# Patient Record
Sex: MALE | Race: WHITE | Employment: FULL TIME | URBAN - NONMETROPOLITAN AREA
[De-identification: names, ages, dates, MRNs, and addresses within clinical notes are randomized per-mention and may not be internally consistent; named-entity substitution may affect disease eponyms.]

---

## 2018-06-02 ENCOUNTER — OFFICE VISIT (OUTPATIENT)
Dept: PRIMARY CARE CLINIC | Age: 43
End: 2018-06-02
Payer: COMMERCIAL

## 2018-06-02 VITALS
TEMPERATURE: 97.3 F | BODY MASS INDEX: 23.74 KG/M2 | HEIGHT: 74 IN | HEART RATE: 63 BPM | DIASTOLIC BLOOD PRESSURE: 74 MMHG | OXYGEN SATURATION: 99 % | SYSTOLIC BLOOD PRESSURE: 124 MMHG | WEIGHT: 185 LBS

## 2018-06-02 DIAGNOSIS — S39.012A STRAIN OF LUMBAR REGION, INITIAL ENCOUNTER: Primary | ICD-10-CM

## 2018-06-02 PROCEDURE — 99214 OFFICE O/P EST MOD 30 MIN: CPT | Performed by: FAMILY MEDICINE

## 2018-06-02 RX ORDER — PREDNISONE 20 MG/1
TABLET ORAL
Qty: 15 TABLET | Refills: 0 | Status: SHIPPED | OUTPATIENT
Start: 2018-06-02 | End: 2019-03-17 | Stop reason: ALTCHOICE

## 2018-06-02 RX ORDER — CYCLOBENZAPRINE HCL 5 MG
5 TABLET ORAL 3 TIMES DAILY PRN
Qty: 30 TABLET | Refills: 0 | Status: SHIPPED | OUTPATIENT
Start: 2018-06-02 | End: 2018-06-12

## 2018-06-02 ASSESSMENT — ENCOUNTER SYMPTOMS
GASTROINTESTINAL NEGATIVE: 1
RESPIRATORY NEGATIVE: 1
BACK PAIN: 1
EYES NEGATIVE: 1

## 2018-08-22 ENCOUNTER — OFFICE VISIT (OUTPATIENT)
Dept: PRIMARY CARE CLINIC | Age: 43
End: 2018-08-22
Payer: COMMERCIAL

## 2018-08-22 VITALS
SYSTOLIC BLOOD PRESSURE: 112 MMHG | WEIGHT: 190 LBS | HEIGHT: 74 IN | HEART RATE: 72 BPM | TEMPERATURE: 97 F | OXYGEN SATURATION: 97 % | BODY MASS INDEX: 24.38 KG/M2 | DIASTOLIC BLOOD PRESSURE: 74 MMHG

## 2018-08-22 DIAGNOSIS — L25.5 RHUS DERMATITIS: Primary | ICD-10-CM

## 2018-08-22 PROCEDURE — 99213 OFFICE O/P EST LOW 20 MIN: CPT | Performed by: PHYSICIAN ASSISTANT

## 2018-08-22 RX ORDER — TRIAMCINOLONE ACETONIDE 1 MG/G
CREAM TOPICAL 3 TIMES DAILY
Qty: 80 G | Refills: 0 | Status: SHIPPED | OUTPATIENT
Start: 2018-08-22 | End: 2019-03-17 | Stop reason: ALTCHOICE

## 2018-08-22 RX ORDER — PREDNISONE 10 MG/1
TABLET ORAL
Qty: 32 TABLET | Refills: 0 | Status: SHIPPED | OUTPATIENT
Start: 2018-08-22 | End: 2019-03-17 | Stop reason: ALTCHOICE

## 2018-08-22 ASSESSMENT — PATIENT HEALTH QUESTIONNAIRE - PHQ9
SUM OF ALL RESPONSES TO PHQ QUESTIONS 1-9: 0
2. FEELING DOWN, DEPRESSED OR HOPELESS: 0
SUM OF ALL RESPONSES TO PHQ QUESTIONS 1-9: 0
SUM OF ALL RESPONSES TO PHQ9 QUESTIONS 1 & 2: 0
1. LITTLE INTEREST OR PLEASURE IN DOING THINGS: 0

## 2018-08-22 ASSESSMENT — ENCOUNTER SYMPTOMS: SHORTNESS OF BREATH: 0

## 2019-03-05 ENCOUNTER — OFFICE VISIT (OUTPATIENT)
Dept: PRIMARY CARE CLINIC | Age: 44
End: 2019-03-05
Payer: COMMERCIAL

## 2019-03-05 VITALS
TEMPERATURE: 97.9 F | WEIGHT: 199.2 LBS | SYSTOLIC BLOOD PRESSURE: 128 MMHG | OXYGEN SATURATION: 97 % | HEART RATE: 90 BPM | DIASTOLIC BLOOD PRESSURE: 72 MMHG | BODY MASS INDEX: 25.57 KG/M2 | RESPIRATION RATE: 18 BRPM | HEIGHT: 74 IN

## 2019-03-05 DIAGNOSIS — R05.9 COUGH: ICD-10-CM

## 2019-03-05 DIAGNOSIS — J10.1 INFLUENZA A: Primary | ICD-10-CM

## 2019-03-05 DIAGNOSIS — R52 GENERALIZED BODY ACHES: ICD-10-CM

## 2019-03-05 LAB
INFLUENZA A ANTIBODY: POSITIVE
INFLUENZA B ANTIBODY: NEGATIVE

## 2019-03-05 PROCEDURE — 99213 OFFICE O/P EST LOW 20 MIN: CPT | Performed by: PHYSICIAN ASSISTANT

## 2019-03-05 PROCEDURE — 87804 INFLUENZA ASSAY W/OPTIC: CPT | Performed by: PHYSICIAN ASSISTANT

## 2019-03-05 RX ORDER — IBUPROFEN 200 MG
400 TABLET ORAL EVERY 6 HOURS PRN
COMMUNITY
End: 2019-03-17

## 2019-03-05 RX ORDER — DEXTROMETHORPHAN POLISTIREX 30 MG/5ML
60 SUSPENSION ORAL 2 TIMES DAILY PRN
COMMUNITY
End: 2019-03-17 | Stop reason: ALTCHOICE

## 2019-03-05 ASSESSMENT — ENCOUNTER SYMPTOMS
SHORTNESS OF BREATH: 1
COUGH: 1
RHINORRHEA: 1

## 2019-03-05 ASSESSMENT — PATIENT HEALTH QUESTIONNAIRE - PHQ9
SUM OF ALL RESPONSES TO PHQ QUESTIONS 1-9: 0
2. FEELING DOWN, DEPRESSED OR HOPELESS: 0
SUM OF ALL RESPONSES TO PHQ QUESTIONS 1-9: 0
1. LITTLE INTEREST OR PLEASURE IN DOING THINGS: 0
SUM OF ALL RESPONSES TO PHQ9 QUESTIONS 1 & 2: 0

## 2019-03-17 ENCOUNTER — OFFICE VISIT (OUTPATIENT)
Dept: PRIMARY CARE CLINIC | Age: 44
End: 2019-03-17
Payer: COMMERCIAL

## 2019-03-17 VITALS
WEIGHT: 200 LBS | OXYGEN SATURATION: 98 % | HEIGHT: 74 IN | HEART RATE: 81 BPM | RESPIRATION RATE: 16 BRPM | SYSTOLIC BLOOD PRESSURE: 124 MMHG | BODY MASS INDEX: 25.67 KG/M2 | DIASTOLIC BLOOD PRESSURE: 70 MMHG | TEMPERATURE: 97.4 F

## 2019-03-17 DIAGNOSIS — M54.50 RECURRENT LOW BACK PAIN: Primary | ICD-10-CM

## 2019-03-17 PROCEDURE — 99213 OFFICE O/P EST LOW 20 MIN: CPT | Performed by: FAMILY MEDICINE

## 2019-03-17 RX ORDER — CYCLOBENZAPRINE HCL 5 MG
5 TABLET ORAL 3 TIMES DAILY PRN
Qty: 30 TABLET | Refills: 0 | Status: SHIPPED | OUTPATIENT
Start: 2019-03-17 | End: 2021-01-19

## 2019-03-17 RX ORDER — PREDNISONE 20 MG/1
TABLET ORAL
Qty: 16 TABLET | Refills: 0 | Status: SHIPPED | OUTPATIENT
Start: 2019-03-17 | End: 2021-01-19

## 2019-03-17 RX ORDER — CYCLOBENZAPRINE HCL 5 MG
5 TABLET ORAL 3 TIMES DAILY PRN
COMMUNITY
End: 2019-03-17 | Stop reason: SDUPTHER

## 2019-03-17 ASSESSMENT — PATIENT HEALTH QUESTIONNAIRE - PHQ9
1. LITTLE INTEREST OR PLEASURE IN DOING THINGS: 0
2. FEELING DOWN, DEPRESSED OR HOPELESS: 0
SUM OF ALL RESPONSES TO PHQ QUESTIONS 1-9: 0
SUM OF ALL RESPONSES TO PHQ QUESTIONS 1-9: 0
SUM OF ALL RESPONSES TO PHQ9 QUESTIONS 1 & 2: 0

## 2019-03-17 ASSESSMENT — ENCOUNTER SYMPTOMS: BACK PAIN: 1

## 2019-03-21 ENCOUNTER — HOSPITAL ENCOUNTER (OUTPATIENT)
Dept: PHYSICAL THERAPY | Age: 44
Setting detail: THERAPIES SERIES
Discharge: HOME | End: 2019-03-21
Payer: COMMERCIAL

## 2019-03-21 PROCEDURE — 97110 THERAPEUTIC EXERCISES: CPT | Performed by: PHYSICAL THERAPIST

## 2019-03-21 PROCEDURE — 97161 PT EVAL LOW COMPLEX 20 MIN: CPT | Performed by: PHYSICAL THERAPIST

## 2019-03-21 ASSESSMENT — PAIN - FUNCTIONAL ASSESSMENT: PAIN_FUNCTIONAL_ASSESSMENT: PREVENTS OR INTERFERES SOME ACTIVE ACTIVITIES AND ADLS

## 2019-03-21 ASSESSMENT — PAIN DESCRIPTION - FREQUENCY: FREQUENCY: INTERMITTENT

## 2019-03-21 ASSESSMENT — PAIN DESCRIPTION - PROGRESSION: CLINICAL_PROGRESSION: GRADUALLY IMPROVING

## 2019-03-21 ASSESSMENT — PAIN DESCRIPTION - ONSET: ONSET: SUDDEN

## 2019-03-21 ASSESSMENT — PAIN DESCRIPTION - LOCATION: LOCATION: BACK

## 2019-03-21 ASSESSMENT — PAIN DESCRIPTION - PAIN TYPE: TYPE: ACUTE PAIN

## 2019-03-21 ASSESSMENT — PAIN DESCRIPTION - ORIENTATION: ORIENTATION: RIGHT

## 2019-03-21 ASSESSMENT — PAIN DESCRIPTION - DESCRIPTORS: DESCRIPTORS: SHARP

## 2019-03-21 ASSESSMENT — PAIN SCALES - GENERAL: PAINLEVEL_OUTOF10: 7

## 2019-03-26 ENCOUNTER — HOSPITAL ENCOUNTER (OUTPATIENT)
Dept: PHYSICAL THERAPY | Age: 44
Setting detail: THERAPIES SERIES
Discharge: HOME | End: 2019-03-26
Payer: COMMERCIAL

## 2019-03-26 PROCEDURE — 97110 THERAPEUTIC EXERCISES: CPT | Performed by: PHYSICAL THERAPY ASSISTANT

## 2019-03-28 ENCOUNTER — HOSPITAL ENCOUNTER (OUTPATIENT)
Dept: PHYSICAL THERAPY | Age: 44
Setting detail: THERAPIES SERIES
Discharge: HOME | End: 2019-03-28
Payer: COMMERCIAL

## 2019-03-28 PROCEDURE — 97110 THERAPEUTIC EXERCISES: CPT | Performed by: PHYSICAL THERAPY ASSISTANT

## 2019-04-02 ENCOUNTER — HOSPITAL ENCOUNTER (OUTPATIENT)
Dept: PHYSICAL THERAPY | Age: 44
Setting detail: THERAPIES SERIES
Discharge: HOME | End: 2019-04-02
Payer: COMMERCIAL

## 2019-04-02 PROCEDURE — 97110 THERAPEUTIC EXERCISES: CPT | Performed by: PHYSICAL THERAPY ASSISTANT

## 2019-04-02 NOTE — FLOWSHEET NOTE
Physical Therapy Daily Treatment Note    Date:  2019    Patient Name:  Mable Mcnulty    :  1975  MRN: [id number]  Restrictions/Precautions:     Medical/Treatment Diagnosis Information:      · Treatment Diagnosis: M54.5 LBP  Insurance/Certification information:  PT Insurance Information: [employer] benefits  Physician Information:  Referring Practitioner: Michelle Colon 149 of care signed (Y/N):  n  Visit# / total visits:   Pain level: 0/10   Functional Assessment Tool Used: Oswestry LBP Scale  Score: 26/50 = 52 % disability    Time In: 3:46  Time Out:4:26    Progress Note: []  Yes  [x]  No  Next due by: Visit #10, or 19      Subjective:  Patient states he is having 0/10 pain on this date. He had slight discomfort yesterday playing pitch and catch with his kids, the pain subsided after doing some of the exercises provided. He applied a hot pack at night. Objective: NERIS per low chart to increase motion, strength and decrease pain for ease with daily activities and return to prior level of function. Patient tolerated therapy session on this date with no increase of pain. Verbal instructions were provided for proper order of exercises. Educated patient on yoga/pilates that he is interested in doing, stating he should hold off until he is done with therapy. Observation:   Test measurements:    +  Exercises: there ex for lumbar ROM, reduction of post derangement  Exercise/Equipment Resistance/Repetitions Other comments   Lay prone 5'    Prone on elbows 5'    Press up 10x3    B PKF 10x3    Modified backbend 10x2    Back Bend 10x2    Prone hip ext. 10x2    Lumbar Roll     Hip ext/abd x10 initiate   Bridging  FT,FA 10x2    LTR 5\"x10    Fire hydrants  x20 ea  ADV    Quadraped alt.   x20  ADV    Camel/cat x20   ADV      [x] Provided verbal/tactile cueing for activities related to strengthening, flexibility, endurance, ROM. (52510)  [] Provided verbal/tactile cueing for activities related to improving balance, (able)  Long term goal 4: Proper nightly sleep (sleeping throughout the night without pain)      Plan:   [x] Continue per plan of care [] Alter current plan (see comments)  [] Plan of care initiated [] Hold pending MD visit [] Discharge    Plan for Next Session: Continue to progress patient per tolerance    Sohail BAR performed session under direct supervision of Keagan [place] PTA. Electronically signed by:   Forrest Ravi

## 2019-04-05 ENCOUNTER — APPOINTMENT (OUTPATIENT)
Dept: PHYSICAL THERAPY | Age: 44
End: 2019-04-05
Payer: COMMERCIAL

## 2019-04-05 ENCOUNTER — HOSPITAL ENCOUNTER (OUTPATIENT)
Dept: PHYSICAL THERAPY | Age: 44
Setting detail: THERAPIES SERIES
Discharge: HOME | End: 2019-04-05
Payer: COMMERCIAL

## 2019-04-05 PROCEDURE — 97110 THERAPEUTIC EXERCISES: CPT

## 2019-04-05 NOTE — FLOWSHEET NOTE
Physical Therapy Daily Treatment Note    Date:  2019    Patient Name:  Sammy Long    :  1975  MRN: [id number]  Restrictions/Precautions:     Medical/Treatment Diagnosis Information:      · Treatment Diagnosis: M54.5 LBP  Insurance/Certification information:  PT Insurance Information: [employer] benefits  Physician Information:  Referring Practitioner: Michelle Colon 149 of care signed (Y/N):  Yes  Visit# / total visits:   Pain level: 0/10   Functional Assessment Tool Used: Oswestry LBP Scale  Score: 26/50 = 52 % disability    Time In: 3:33  Time Out: 4:15    Progress Note: []  Yes  [x]  No  Next due by: Visit #10, or 19      Subjective:  Patient states he performed yard work around the house Wednesday evening, which made him very sore the next day. Notes that he had to sit a lot yesterday for meetings and that caused increased pain in low back and down his right leg, performed HEP to abolish symptoms. Today patient reports no pain, just sore and stiff. Feels he is over 90% of normal.      Objective: NERIS per low chart to increase motion, strength and decrease pain for ease with daily activities and return to prior level of function. Advanced per flow sheet, increasing reps and resistance as capable. Patient tolerated treatment well, indicating increased soreness in core/LE muscles upon completion. Discussed body mechanics during ADLs with patient to minimize discomfort, provide protective measures and increase efficiency; patient noted good understanding. Observation: Decreased lordotic lumbar curvature noted in standing and with therex.    Test measurements:      Exercises: there ex for lumbar ROM, reduction of post derangement  Exercise/Equipment Resistance/Repetitions Other comments   Lay prone     Prone on elbows 5'    Press up 10x3    B PKF 10x3    Multifidi  Stabilization 10x quadruped   Quadruped Alt U/LE 10x    Quadruped Arch/Sag 10x         3-way hip with core stabilization 10xea    Modified backbend     Back May[place] 9-positions, x5ea    TBand Rows/Ext Selestino Kasper init   TBand SPO 10x, black init        Lumbar Roll          Bridging  FT,FA    LTR              Treadmill 3.5mph, 5' init   [x] Provided verbal/tactile cueing for activities related to strengthening, flexibility, endurance, ROM. (00698)  [] Provided verbal/tactile cueing for activities related to improving balance, coordination, kinesthetic sense, posture, motor skill, proprioception. (12655)    Therapeutic Activities:     [] Therapeutic activities, direct (one-on-one) patient contact (use of dynamic activities to improve functional performance). (21118)    Gait:   [] Provided training and instruction to the patient for ambulation re-education. (74095)    Self-Care/ADL's  [] Self-care/home management training and compensatory training, meal preparation, safety procedures, and instructions in use of assistive technology devices/adaptive equipment, direct one-on-one contact. (16418)    Home Exercise Program:   Lumbar extension progression for post derangement  [x] Reviewed/Progressed HEP activities related to strengthening, flexibility, endurance, ROM. (88389)  [] Reviewed/Progressed HEP activities related to improving balance, coordination, kinesthetic sense, posture, motor skill, proprioception.  (36589)    Manual Treatments:    [] Provided manual therapy to mobilize soft tissue/joints for the purpose of modulating pain, promoting relaxation,  increasing ROM, reducing/eliminating soft tissue swelling/inflammation/restriction, improving soft tissue extensibility. (66201)    Service Based Modalities:      Timed Code Treatment Minutes:    There ex /HEP  42'    Total Treatment Minutes:   43'    Treatment/Activity Tolerance:  [x] Patient tolerated treatment well [] Patient limited by fatique  [] Patient limited by pain  [] Patient limited by other medical complications  [] Other:     Prognosis: [x] Good [] Fair  [] Poor    Patient Requires Follow-up: [] Yes  [] No      Goals:  Short term goals  Time Frame for Short term goals: 1 week    Short term goal 1: Start HEP (compliant)    Long term goals  Time Frame for Long term goals : 4 weeks  Long term goal 1: Pain 1-2/10 for return to regular activity (0/10)  Long term goal 2: Lumbar extension thru 1005 of ROM for proper spinal mechanics  Long term goal 3: Tolerate sitting up to 60 min (able)  Long term goal 4: Proper nightly sleep (sleeping throughout the night without pain)      Plan:   [x] Continue per plan of care [] Alter current plan (see comments)  [] Plan of care initiated [] Hold pending MD visit [] Discharge    Plan for Next Session: Monitor response to advanced flow sheet and progress as tolerated.      Electronically signed by:  Femi Perez

## 2019-04-08 ENCOUNTER — HOSPITAL ENCOUNTER (OUTPATIENT)
Dept: PHYSICAL THERAPY | Age: 44
Setting detail: THERAPIES SERIES
Discharge: HOME | End: 2019-04-08
Payer: COMMERCIAL

## 2019-04-08 PROCEDURE — 97110 THERAPEUTIC EXERCISES: CPT

## 2019-04-08 NOTE — FLOWSHEET NOTE
Physical Therapy Daily Treatment Note    Date:  2019    Patient Name:  Sugey Bangura    :  1975  MRN: [id number]  Restrictions/Precautions:     Medical/Treatment Diagnosis Information:      · Treatment Diagnosis: M54.5 LBP  Insurance/Certification information:  PT Insurance Information: [employer] benefits  Physician Information:  Referring Practitioner: Michelle Colon 149 of care signed (Y/N):  Yes  Visit# / total visits: 6/8  Pain level: 0/10   Functional Assessment Tool Used: Oswestry LBP Scale  Score: 26/50 = 52 % disability    Time In: 3:33  Time Out: 4:20    Progress Note: []  Yes  [x]  No  Next due by: Visit #10, or 19      Subjective:  Patient reports to therapy with no complaints of LBP, however notes feeling stiff today. Notes he worked out in his yard over the weekend and maintained good body mechanics and posture with all activities. Objective: NERIS per low chart to increase motion, strength and decrease pain for ease with daily activities and return to prior level of function. Advanced per flow sheet, increasing reps and resistance as capable. Patient tolerated treatment well, indicating increased soreness in core/LE muscles upon completion. Observation: Decreased lordotic lumbar curvature noted in standing and with therex.    Test measurements:      Exercises: there ex for lumbar ROM, reduction of post derangement  Exercise/Equipment Resistance/Repetitions Other comments   Lay prone     Prone on elbows 5'    Press up 10x3    B PKF 10x3    Multifidi  Stabilization 15x quadruped   Quadruped Alt U/LE 10x    Quadruped Arch/Sag 10x         3-way hip with core stabilization 15xea    Modified backbend 20x    Back Bend 10x2    Squat Matrix 9-positions, x10ea ADV   Machine Rows 50lbs, x15 Init   Lat Pulldowns 70lbs, x15 Init   TBand Step outs Black, x10 Init        Lumbar Roll          Bridging  FT,FA With susan x10    LTR 5\"x10             Treadmill Retro, 5', 1.0mph init   [x] Provided verbal/tactile cueing for activities related to strengthening, flexibility, endurance, ROM. (79208)  [] Provided verbal/tactile cueing for activities related to improving balance, coordination, kinesthetic sense, posture, motor skill, proprioception. (40430)    Therapeutic Activities:     [] Therapeutic activities, direct (one-on-one) patient contact (use of dynamic activities to improve functional performance). (15024)    Gait:   [] Provided training and instruction to the patient for ambulation re-education. (38397)    Self-Care/ADL's  [] Self-care/home management training and compensatory training, meal preparation, safety procedures, and instructions in use of assistive technology devices/adaptive equipment, direct one-on-one contact. (11594)    Home Exercise Program:   Lumbar extension progression for post derangement  [x] Reviewed/Progressed HEP activities related to strengthening, flexibility, endurance, ROM. (42226)  [] Reviewed/Progressed HEP activities related to improving balance, coordination, kinesthetic sense, posture, motor skill, proprioception.  (07767)    Manual Treatments:    [] Provided manual therapy to mobilize soft tissue/joints for the purpose of modulating pain, promoting relaxation,  increasing ROM, reducing/eliminating soft tissue swelling/inflammation/restriction, improving soft tissue extensibility. (57839)    Service Based Modalities:      Timed Code Treatment Minutes:    There ex /HEP  47'    Total Treatment Minutes:   52'    Treatment/Activity Tolerance:  [x] Patient tolerated treatment well [] Patient limited by fatique  [] Patient limited by pain  [] Patient limited by other medical complications  [] Other:     Prognosis: [x] Good [] Fair  [] Poor    Patient Requires Follow-up: [] Yes  [] No      Goals:  Short term goals  Time Frame for Short term goals: 1 week    Short term goal 1: Start HEP (compliant)    Long term goals  Time Frame for Long term goals : 4 weeks  Long term goal 1: Pain 1-2/10 for return to regular activity (0/10)  Long term goal 2: Lumbar extension thru 1005 of ROM for proper spinal mechanics  Long term goal 3: Tolerate sitting up to 60 min (able)  Long term goal 4: Proper nightly sleep (sleeping throughout the night without pain)      Plan:   [x] Continue per plan of care [] Alter current plan (see comments)  [] Plan of care initiated [] Hold pending MD visit [] Discharge    Plan for Next Session: Monitor response to advanced flow sheet and progress as tolerated.      Electronically signed by:  Kiersten Ojeda

## 2019-04-11 ENCOUNTER — HOSPITAL ENCOUNTER (OUTPATIENT)
Dept: PHYSICAL THERAPY | Age: 44
Setting detail: THERAPIES SERIES
Discharge: HOME | End: 2019-04-11
Payer: COMMERCIAL

## 2019-04-11 PROCEDURE — 97110 THERAPEUTIC EXERCISES: CPT

## 2019-04-11 NOTE — FLOWSHEET NOTE
Physical Therapy Daily Treatment Note    Date:  2019    Patient Name:  Mable Mcnulty    :  1975  MRN: [id number]  Restrictions/Precautions:     Medical/Treatment Diagnosis Information:      · Treatment Diagnosis: M54.5 LBP  Insurance/Certification information:  PT Insurance Information: [employer] benefits  Physician Information:  Referring Practitioner: Michelle Colon 149 of care signed (Y/N):  Yes  Visit# / total visits: 7/8  Pain level: 0/10   Functional Assessment Tool Used: Oswestry LBP Scale  Score: 26/50 = 52 % disability    Time In: 3:28  Time Out: 4:16    Progress Note: []  Yes  [x]  No  Next due by: Visit #10, or 19      Subjective:   Patient states he was very sore in his upper back after last session. No recent complaints with low back other than stiffness. Feels he is close to ready to be done with therapy. Objective: NERIS per low chart to increase motion, strength and decrease pain for ease with daily activities and return to prior level of function. Advanced per flow sheet, increasing reps and resistance as capable. Patient tolerated treatment well, indicating increased soreness in core/LE muscles upon completion. Observation: Decreased lordotic lumbar curvature noted in standing and with therex.    Test measurements:  Lumbar AROM: Flexion: distal shin, Extension: 30 degrees, Sidebend: to knees bilaterally (19)    Exercises: there ex for lumbar ROM, reduction of post derangement  Exercise/Equipment Resistance/Repetitions Other comments   Lay prone     Prone on elbows     Press up 10x3    B PKF 10x3    Multifidi  Stabilization 15x quadruped   Quadruped Alt U/LE 15x    Quadruped Arch/Sag 15x         3-way hip with core stabilization 10x ea on [employer]    Modified backbend 20x    Back [address] 9-positions, x10ea ADV   Machine Rows 50lbs, x15    Lat Pulldowns 70lbs, x15    TBand Step outs Black, x15    Resisted side step 3 laps yellow         Lumbar Roll          [employer] With susan x10    LTR 5\"x10             Treadmill Retro, 6', 1.0mph    [x] Provided verbal/tactile cueing for activities related to strengthening, flexibility, endurance, ROM. (87267)  [] Provided verbal/tactile cueing for activities related to improving balance, coordination, kinesthetic sense, posture, motor skill, proprioception. (13322)    Therapeutic Activities:     [] Therapeutic activities, direct (one-on-one) patient contact (use of dynamic activities to improve functional performance). (06706)    Gait:   [] Provided training and instruction to the patient for ambulation re-education. (16719)    Self-Care/ADL's  [] Self-care/home management training and compensatory training, meal preparation, safety procedures, and instructions in use of assistive technology devices/adaptive equipment, direct one-on-one contact. (91192)    Home Exercise Program:   Lumbar extension progression for post derangement  [x] Reviewed/Progressed HEP activities related to strengthening, flexibility, endurance, ROM. (01038)  [] Reviewed/Progressed HEP activities related to improving balance, coordination, kinesthetic sense, posture, motor skill, proprioception.  (75640)    Manual Treatments:    [] Provided manual therapy to mobilize soft tissue/joints for the purpose of modulating pain, promoting relaxation,  increasing ROM, reducing/eliminating soft tissue swelling/inflammation/restriction, improving soft tissue extensibility. (37282)    Service Based Modalities:      Timed Code Treatment Minutes:    There ex /HEP  48'    Total Treatment Minutes:   50'    Treatment/Activity Tolerance:  [x] Patient tolerated treatment well [] Patient limited by fatique  [] Patient limited by pain  [] Patient limited by other medical complications  [] Other:     Prognosis: [x] Good [] Fair  [] Poor    Patient Requires Follow-up: [] Yes  [] No      Goals:  Short term goals  Time Frame for Short term goals: 1 week    Short term goal 1: Start HEP (compliant)    Long term goals  Time Frame for Long term goals : 4 weeks  Long term goal 1: Pain 1-2/10 for return to regular activity (0/10)  Long term goal 2: Lumbar extension thru 1005 of ROM for proper spinal mechanics 30degrees Extension  Long term goal 3: Tolerate sitting up to 60 min (able)  Long term goal 4: Proper nightly sleep (sleeping throughout the night without pain)      Plan:   [x] Continue per plan of care [] Alter current plan (see comments)  [] Plan of care initiated [] Hold pending MD visit [] Discharge    Plan for Next Session: Patient may be ready for discharge next visit.      Electronically signed by:  Robin Garibay

## 2019-04-15 ENCOUNTER — HOSPITAL ENCOUNTER (OUTPATIENT)
Dept: PHYSICAL THERAPY | Age: 44
Setting detail: THERAPIES SERIES
Discharge: HOME | End: 2019-04-15
Payer: COMMERCIAL

## 2019-04-15 PROCEDURE — 97110 THERAPEUTIC EXERCISES: CPT | Performed by: PHYSICAL THERAPIST

## 2019-04-15 NOTE — FLOWSHEET NOTE
Physical Therapy Daily Treatment Note    Date:  4/15/2019    Patient Name:  Oliver Gregory    :  1975  MRN: [id number]  Restrictions/Precautions:     Medical/Treatment Diagnosis Information:      · Treatment Diagnosis: M54.5 LBP  Insurance/Certification information:  PT Insurance Information: [employer] benefits  Physician Information:  Referring Practitioner: Michelle Colon 149 of care signed (Y/N):  Yes  Visit# / total visits:   Pain level: 0/10   Functional Assessment Tool Used: Oswestry LBP Scale  Score: 0/50 = 0 % disability    Time In: 3:57 Time Out: 4:25    Progress Note: [x]  Yes  []  No  Next due by: Visit #10, or 19      Subjective:  Doing very good. No pain over the weekend. Objective:   Observation:     Test measurements:   Full range prone extension  Negative dural tension with SLR to 85 deg. Negative Slump     Exercises: there ex for lumbar ROM, reduction of post derangement  Exercise/Equipment Resistance/Repetitions Other comments   Lay prone     Prone on elbows     Press up 10x3 2 sets PT OP   B PKF 10x3    Multifidi  Stabilization 15x quadruped   Quadruped Alt U/LE 15x    Quadruped Arch/Sag 10x         3-way hip with core stabilization     Modified backbend 10x, x2    Back Bend 10x2    Squat Matrix 9-positions, x10ea ADV   Machine Rows     Lat Pulldowns     TBand Step outs     Resisted side step          Lumbar Roll          Bridges     LTR              Treadmill Retro, 6', 1.0mph    [x] Provided verbal/tactile cueing for activities related to strengthening, flexibility, endurance, ROM. (45910)  [] Provided verbal/tactile cueing for activities related to improving balance, coordination, kinesthetic sense, posture, motor skill, proprioception. (94935)    Therapeutic Activities:     [] Therapeutic activities, direct (one-on-one) patient contact (use of dynamic activities to improve functional performance).  (89590)    Gait:   [] Provided training and instruction to the patient for ambulation

## 2019-04-15 NOTE — DISCHARGE SUMMARY
[facility]    [x] [place]  Phone: [phone]  Fax: [phone]      [] [place]  Phone: [phone]  Fax: [phone]    Physical Therapy Discharge Note  Date: 4/15/2019        Patient Name:  aJvier Higginbotham    :  1975  MRN: [id number]  Restrictions/Precautions:      Medical/Treatment Diagnosis Information:  ·   M54.5 LBP  ·    Insurance/Certification information:    [employer] benefits  Physician Information:     Black    Plan of care signed (Y/N): y  Visit# / total visits:  8  Pain level: 0/10         Time Period for Report:   Cancels/No-shows to date:     Plan of Care/Treatment to date:  [x] Therapeutic Exercise    [] Modalities:  [] Therapeutic Activity     [] Ultrasound  [] Electrical Stimulation  [] Gait Training      [] Cervical Traction    [] Lumbar Traction  [] Neuromuscular Re-education  [] Cold/hotpack [] Iontophoresis  [x] Instruction in HEP      Other:  [x] Manual Therapy       []    [] Aquatic Therapy       []                    ? Subjective:     Doing very good. No pain over the weekend             Objective:    Test measurements:   Full range prone extension  Negative dural tension with SLR to 85 deg.  Negative Slump       Plan:    d/c       Goals:    Short term goals  Time Frame for Short term goals: 1 week    Short term goal 1: Start HEP - met     Long term goals  Time Frame for Long term goals : 4 weeks  Long term goal 1: Pain 1-2/10 for return to regular activity - met  Long term goal 2: Lumbar extension thru 100 % of ROM for proper spinal mechanics - met  Long term goal 3: Tolerate sitting up to 60 min - met  Long term goal 4: Proper nightly sleep -met             Percentage of Goals Met: 100            Discharge Prognosis: [] Excellent [x] Good [] Fair  [] Poor     Goal Status:  [x] Achieved [] Partially Achieved  [] Not Achieved       Electronically signed by:  Nick Ricks, PT

## 2020-08-17 ENCOUNTER — HOSPITAL ENCOUNTER (EMERGENCY)
Age: 45
Discharge: HOME | End: 2020-08-17
Attending: EMERGENCY MEDICINE
Payer: COMMERCIAL

## 2020-08-17 ENCOUNTER — APPOINTMENT (OUTPATIENT)
Dept: CT IMAGING | Age: 45
End: 2020-08-17
Payer: COMMERCIAL

## 2020-08-17 VITALS
HEART RATE: 77 BPM | TEMPERATURE: 96.4 F | RESPIRATION RATE: 14 BRPM | OXYGEN SATURATION: 100 % | DIASTOLIC BLOOD PRESSURE: 78 MMHG | SYSTOLIC BLOOD PRESSURE: 122 MMHG

## 2020-08-17 PROCEDURE — 96372 THER/PROPH/DIAG INJ SC/IM: CPT

## 2020-08-17 PROCEDURE — 99284 EMERGENCY DEPT VISIT MOD MDM: CPT

## 2020-08-17 PROCEDURE — 70450 CT HEAD/BRAIN W/O DYE: CPT

## 2020-08-17 PROCEDURE — 6360000002 HC RX W HCPCS: Performed by: EMERGENCY MEDICINE

## 2020-08-17 PROCEDURE — 6370000000 HC RX 637 (ALT 250 FOR IP): Performed by: EMERGENCY MEDICINE

## 2020-08-17 RX ORDER — ONDANSETRON 4 MG/1
4 TABLET, ORALLY DISINTEGRATING ORAL ONCE
Status: COMPLETED | OUTPATIENT
Start: 2020-08-17 | End: 2020-08-17

## 2020-08-17 RX ORDER — KETOROLAC TROMETHAMINE 30 MG/ML
30 INJECTION, SOLUTION INTRAMUSCULAR; INTRAVENOUS ONCE
Status: COMPLETED | OUTPATIENT
Start: 2020-08-17 | End: 2020-08-17

## 2020-08-17 RX ADMIN — KETOROLAC TROMETHAMINE 30 MG: 30 INJECTION, SOLUTION INTRAMUSCULAR at 21:22

## 2020-08-17 RX ADMIN — ONDANSETRON 4 MG: 4 TABLET, ORALLY DISINTEGRATING ORAL at 21:23

## 2020-08-17 ASSESSMENT — PAIN SCALES - GENERAL: PAINLEVEL_OUTOF10: 6

## 2020-08-17 ASSESSMENT — PAIN DESCRIPTION - ONSET: ONSET: ON-GOING

## 2020-08-17 ASSESSMENT — PAIN DESCRIPTION - PAIN TYPE: TYPE: ACUTE PAIN

## 2020-08-17 ASSESSMENT — PAIN DESCRIPTION - DESCRIPTORS: DESCRIPTORS: ACHING

## 2020-08-17 ASSESSMENT — PAIN DESCRIPTION - LOCATION: LOCATION: HEAD

## 2020-08-17 ASSESSMENT — PAIN DESCRIPTION - FREQUENCY: FREQUENCY: CONTINUOUS

## 2020-08-17 ASSESSMENT — PAIN DESCRIPTION - ORIENTATION: ORIENTATION: RIGHT;LEFT

## 2020-08-18 NOTE — ED PROVIDER NOTES
eMERGENCY dEPARTMENT eNCOUnter      Pt Name: Trinh Brito  MRN: [id number]  [place] 1975  Date of evaluation: 8/17/2020      CHIEF COMPLAINT       Chief Complaint   Patient presents with    Headache     3pm         HISTORY OF PRESENT ILLNESS    Trinh Brito is a 39 y.o. male who presents with headache. Patient states he is been having headache since about 3:30, not acute onset, not the worst headache of his life that bad. He had tried taking over-the-counter ibuprofen without success. He complains of some nausea, no lightheadedness, no photophobia, no fevers, chills, coughs or runny nose. No other exacerbating or relieving factors         REVIEW OF SYSTEMS       Review of systems are all reviewed and negative except stated above in HPI     PAST MEDICAL HISTORY    has no past medical history on file. SURGICAL HISTORY      has a past surgical history that includes Anterior cruciate ligament repair (Right, 2004). CURRENT MEDICATIONS       Discharge Medication List as of 8/17/2020 10:05 PM      CONTINUE these medications which have NOT CHANGED    Details   cyclobenzaprine (FLEXERIL) 5 MG tablet Take 1 tablet by mouth 3 times daily as needed for Muscle spasms, Disp-30 tablet, R-0Normal      predniSONE (DELTASONE) 20 MG tablet Take 2 tabs by mouth daily x 6 days, then 1 tab daily x 4 days. , Disp-16 tablet, R-0Normal             ALLERGIES     has No Known Allergies. FAMILY HISTORY     He indicated that his mother is alive. He indicated that his father is alive. family history is not on file. SOCIAL HISTORY      reports that he has never smoked. He has never used smokeless tobacco. He reports that he does not drink alcohol or use drugs. PHYSICAL EXAM     INITIAL VITALS:  tympanic temperature is 96.4 °F (35.8 °C). His blood pressure is 122/78 and his pulse is 77. His respiration is 14 and oxygen saturation is 100%.       Gen.: Patient is well-hydrated, nontoxic female in no apparent words are mis-transcribed.)    Gong MD, F.A.C.E.P.   Attending Emergency Physician        Cary Wade MD  08/17/20 [id number]

## 2021-01-19 ENCOUNTER — VIRTUAL VISIT (OUTPATIENT)
Dept: FAMILY MEDICINE CLINIC | Age: 46
End: 2021-01-19
Payer: COMMERCIAL

## 2021-01-19 DIAGNOSIS — Z00.00 ENCOUNTER FOR PREVENTIVE CARE: ICD-10-CM

## 2021-01-19 DIAGNOSIS — F41.9 ANXIETY: ICD-10-CM

## 2021-01-19 DIAGNOSIS — U07.1 COVID-19 VIRUS INFECTION: ICD-10-CM

## 2021-01-19 DIAGNOSIS — Z76.89 ENCOUNTER TO ESTABLISH CARE: Primary | ICD-10-CM

## 2021-01-19 PROCEDURE — 99203 OFFICE O/P NEW LOW 30 MIN: CPT | Performed by: FAMILY MEDICINE

## 2021-01-19 RX ORDER — ESCITALOPRAM OXALATE 10 MG/1
10 TABLET ORAL DAILY
Qty: 30 TABLET | Refills: 3 | Status: SHIPPED | OUTPATIENT
Start: 2021-01-19 | End: 2021-03-16 | Stop reason: SDUPTHER

## 2021-01-19 ASSESSMENT — PATIENT HEALTH QUESTIONNAIRE - PHQ9
2. FEELING DOWN, DEPRESSED OR HOPELESS: 0
SUM OF ALL RESPONSES TO PHQ QUESTIONS 1-9: 0

## 2021-01-19 ASSESSMENT — ENCOUNTER SYMPTOMS
EYES NEGATIVE: 1
RESPIRATORY NEGATIVE: 1
GASTROINTESTINAL NEGATIVE: 1

## 2021-01-19 NOTE — PROGRESS NOTES
1/19/2021   Patient-Reported Weight 190lbs   Patient-Reported Height 6ft 2in   Patient-Reported Systolic 947   Patient-Reported Diastolic 90   Patient-Reported Temperature 98.0        Assessment:       Encounter Diagnoses   Name Primary?  Encounter to establish care Yes    Encounter for preventive care     Anxiety     COVID-19 virus infection          Plan:      Current covid infection since last Thursday by symptoms. Tested positive yesterday. Minimal symptoms of body aches and arthralgias. No fever or cough at present. Discussed typical course, supportive care, and complications. He has pulse oximeter at home to test with. Prevention: updating cbc, bmp,lipids. Anxiety: mostly work and social situations involving confined spaces and speaking. Meetings make him anxious. Bus rides, fair rides, offices all give anxiety. He would like to trial a daily medication to curb anxiety. Plan lexapro 10mg/day.               Janie Garces MD

## 2021-02-02 LAB
BASOPHILS ABSOLUTE: NORMAL
BASOPHILS RELATIVE PERCENT: NORMAL
BUN BLDV-MCNC: 18 MG/DL
CALCIUM SERPL-MCNC: 9.5 MG/DL
CHLORIDE BLD-SCNC: 104 MMOL/L
CHOLESTEROL, TOTAL: 165 MG/DL
CHOLESTEROL/HDL RATIO: 3.6
CO2: 32 MMOL/L
CREAT SERPL-MCNC: 1.11 MG/DL
EOSINOPHILS ABSOLUTE: NORMAL
EOSINOPHILS RELATIVE PERCENT: NORMAL
GFR CALCULATED: 80
GLUCOSE BLD-MCNC: 93 MG/DL
HCT VFR BLD CALC: 43.8 % (ref 41–53)
HDLC SERPL-MCNC: 46 MG/DL (ref 35–70)
HEMOGLOBIN: 14.8 G/DL (ref 13.5–17.5)
LDL CHOLESTEROL CALCULATED: 90 MG/DL (ref 0–160)
LYMPHOCYTES ABSOLUTE: NORMAL
LYMPHOCYTES RELATIVE PERCENT: NORMAL
MCH RBC QN AUTO: 33 PG
MCHC RBC AUTO-ENTMCNC: 33.8 G/DL
MCV RBC AUTO: 97.8 FL
MONOCYTES ABSOLUTE: NORMAL
MONOCYTES RELATIVE PERCENT: NORMAL
NEUTROPHILS ABSOLUTE: NORMAL
NEUTROPHILS RELATIVE PERCENT: NORMAL
NONHDLC SERPL-MCNC: 119 MG/DL
PDW BLD-RTO: NORMAL %
PLATELET # BLD: 285 K/ΜL
PMV BLD AUTO: 12.2 FL
POTASSIUM SERPL-SCNC: 4.8 MMOL/L
RBC # BLD: 4.48 10^6/ΜL
SODIUM BLD-SCNC: 142 MMOL/L
TRIGL SERPL-MCNC: 203 MG/DL
VLDLC SERPL CALC-MCNC: NORMAL MG/DL
WBC # BLD: 6 10^3/ML

## 2021-02-03 DIAGNOSIS — Z00.00 ENCOUNTER FOR PREVENTIVE CARE: ICD-10-CM

## 2021-03-16 RX ORDER — ESCITALOPRAM OXALATE 10 MG/1
10 TABLET ORAL DAILY
Qty: 90 TABLET | Refills: 0 | Status: SHIPPED | OUTPATIENT
Start: 2021-03-16 | End: 2021-04-16 | Stop reason: SDUPTHER

## 2021-04-16 ENCOUNTER — IMMUNIZATION (OUTPATIENT)
Dept: LAB | Age: 46
End: 2021-04-16
Payer: COMMERCIAL

## 2021-04-16 ENCOUNTER — OFFICE VISIT (OUTPATIENT)
Dept: FAMILY MEDICINE CLINIC | Age: 46
End: 2021-04-16
Payer: COMMERCIAL

## 2021-04-16 VITALS
HEIGHT: 74 IN | HEART RATE: 72 BPM | DIASTOLIC BLOOD PRESSURE: 64 MMHG | WEIGHT: 197 LBS | SYSTOLIC BLOOD PRESSURE: 100 MMHG | BODY MASS INDEX: 25.28 KG/M2

## 2021-04-16 DIAGNOSIS — U07.1 COVID-19 VIRUS INFECTION: Primary | ICD-10-CM

## 2021-04-16 DIAGNOSIS — F41.9 ANXIETY: ICD-10-CM

## 2021-04-16 PROCEDURE — 91301 COVID-19, MODERNA VACCINE 100MCG/0.5ML DOSE: CPT | Performed by: INTERNAL MEDICINE

## 2021-04-16 PROCEDURE — 99214 OFFICE O/P EST MOD 30 MIN: CPT | Performed by: FAMILY MEDICINE

## 2021-04-16 PROCEDURE — 0011A COVID-19, MODERNA VACCINE 100MCG/0.5ML DOSE: CPT | Performed by: INTERNAL MEDICINE

## 2021-04-16 RX ORDER — ESCITALOPRAM OXALATE 10 MG/1
10 TABLET ORAL DAILY
Qty: 90 TABLET | Refills: 3 | Status: SHIPPED | OUTPATIENT
Start: 2021-04-16 | End: 2022-05-16 | Stop reason: SDUPTHER

## 2021-04-16 SDOH — HEALTH STABILITY: MENTAL HEALTH: HOW MANY STANDARD DRINKS CONTAINING ALCOHOL DO YOU HAVE ON A TYPICAL DAY?: NOT ASKED

## 2021-04-16 ASSESSMENT — PATIENT HEALTH QUESTIONNAIRE - PHQ9
SUM OF ALL RESPONSES TO PHQ QUESTIONS 1-9: 0
SUM OF ALL RESPONSES TO PHQ QUESTIONS 1-9: 0
2. FEELING DOWN, DEPRESSED OR HOPELESS: 0
SUM OF ALL RESPONSES TO PHQ9 QUESTIONS 1 & 2: 0

## 2021-04-16 ASSESSMENT — ENCOUNTER SYMPTOMS
GASTROINTESTINAL NEGATIVE: 1
RESPIRATORY NEGATIVE: 1
EYES NEGATIVE: 1

## 2021-04-16 NOTE — PROGRESS NOTES
Subjective:      Patient ID: Christen Carpio is a 42 y.o. male. [facility]  Routine follow up on chronic medical conditions, refills, and review of updated labs. Newer patient established in the last 6 months. He had covid 19 infection. Fairly mild course with head cold and body aches. No lung issues, no fever. Lost taste and smell which are coming back slowly. History reviewed. No pertinent past medical history. Lower back sprain  covid 19 positive 1/18/2021  Anxiety    Past Surgical History:   Procedure Laterality Date    ANTERIOR CRUCIATE LIGAMENT REPAIR Right 2004   No Known Allergies    Review of Systems   Constitutional: Negative. HENT: Negative. Eyes: Negative. Respiratory: Negative. Cardiovascular: Negative. Gastrointestinal: Negative. Genitourinary: Negative. Musculoskeletal: Negative. Skin: Negative. Neurological: Negative. Psychiatric/Behavioral: The patient is nervous/anxious (confined spaces). Objective:   Physical Exam  HENT:      Head: Normocephalic. Nose: Nose normal.   Eyes:      Extraocular Movements: Extraocular movements intact. Neck:      Musculoskeletal: Normal range of motion. Pulmonary:      Effort: Pulmonary effort is normal. No respiratory distress. Breath sounds: No wheezing. Abdominal:      General: Bowel sounds are normal.   Skin:     Findings: No rash. Neurological:      Mental Status: He is alert. Mental status is at baseline. Psychiatric:         Mood and Affect: Mood normal.         Behavior: Behavior normal.         Thought Content: Thought content normal.         Judgment: Judgment normal.       /64 (Site: Right Upper Arm, Position: Sitting, Cuff Size: Large Adult)   Pulse 72   Ht 6' 2\" (1.88 m)   Wt 197 lb (89.4 kg)   BMI 25.29 kg/m²   No visits with results within 2 Month(s) from this visit.    Latest known visit with results is:   Orders Only on 02/03/2021   Component Date Value    Cholesterol, Total 02/02/2021 165     HDL 02/02/2021 46     LDL Calculated 02/02/2021 90     Triglycerides 02/02/2021 203     Chol/HDL Ratio 02/02/2021 3.6     Cholesterol non HDL 02/02/2021 119     Sodium 02/02/2021 142     Chloride 02/02/2021 104     Potassium 02/02/2021 4.8     BUN 02/02/2021 18     CREATININE 02/02/2021 1.11     Glucose 02/02/2021 93     CO2 02/02/2021 32     Calcium 02/02/2021 9.5     Gfr Calculated 02/02/2021 80     WBC 02/02/2021 6.0     RBC 02/02/2021 4.48     Hemoglobin 02/02/2021 14.8     Hematocrit 02/02/2021 43.8     MCV 02/02/2021 97.8     MCH 02/02/2021 33.0     MCHC 02/02/2021 33.8     Platelets 49/70/3746 285     MPV 02/02/2021 12.2        Assessment:       Encounter Diagnoses   Name Primary?  COVID-19 virus infection Yes    Anxiety            Plan:      S/p covid . Mild course without need for oxygen or intervention. Residual loss of taste and smell slowly coming back. He just received 1st covid vaccine today. Labs reviewed as above . No current concerns. Anxiety: mostly work and social situations involving confined spaces and speaking. Meetings make him anxious. Bus rides, fair rides, offices all give anxiety. He would like to trial a daily medication to curb anxiety. Plan lexapro 10mg/day. Seems to be helping at present.                Miranda Mir MD

## 2021-05-14 ENCOUNTER — IMMUNIZATION (OUTPATIENT)
Dept: LAB | Age: 46
End: 2021-05-14
Payer: COMMERCIAL

## 2021-05-14 PROCEDURE — 91301 COVID-19, MODERNA VACCINE 100MCG/0.5ML DOSE: CPT | Performed by: INTERNAL MEDICINE

## 2021-05-14 PROCEDURE — 0012A COVID-19, MODERNA VACCINE 100MCG/0.5ML DOSE: CPT | Performed by: INTERNAL MEDICINE

## 2021-12-17 ENCOUNTER — PATIENT MESSAGE (OUTPATIENT)
Dept: FAMILY MEDICINE CLINIC | Age: 46
End: 2021-12-17

## 2021-12-20 NOTE — TELEPHONE ENCOUNTER
From: Cami Murphy  To: Dr. Eva Flores: 12/17/2021 11:07 PM EST  Subject: Non-Urgent Medical Question    I had Covid 23 in early January of 2021. I got my second Covid Vaccine on May 14th. When should I look to get the booster shot?

## 2022-01-12 ENCOUNTER — IMMUNIZATION (OUTPATIENT)
Dept: LAB | Age: 47
End: 2022-01-12
Payer: COMMERCIAL

## 2022-01-12 PROCEDURE — 0064A COVID-19, MODERNA BOOSTER VACCINE 0.25ML DOSE: CPT | Performed by: INTERNAL MEDICINE

## 2022-01-12 PROCEDURE — 91306 COVID-19, MODERNA BOOSTER VACCINE 0.25ML DOSE: CPT | Performed by: INTERNAL MEDICINE

## 2022-05-16 ENCOUNTER — OFFICE VISIT (OUTPATIENT)
Dept: FAMILY MEDICINE CLINIC | Age: 47
End: 2022-05-16
Payer: COMMERCIAL

## 2022-05-16 VITALS
HEIGHT: 74 IN | SYSTOLIC BLOOD PRESSURE: 100 MMHG | BODY MASS INDEX: 25.93 KG/M2 | HEART RATE: 68 BPM | DIASTOLIC BLOOD PRESSURE: 64 MMHG | WEIGHT: 202 LBS

## 2022-05-16 DIAGNOSIS — Z00.00 ENCOUNTER FOR PREVENTIVE CARE: Primary | ICD-10-CM

## 2022-05-16 DIAGNOSIS — F41.9 ANXIETY: ICD-10-CM

## 2022-05-16 DIAGNOSIS — Z12.11 ENCOUNTER FOR SCREENING COLONOSCOPY: ICD-10-CM

## 2022-05-16 PROCEDURE — 99214 OFFICE O/P EST MOD 30 MIN: CPT | Performed by: FAMILY MEDICINE

## 2022-05-16 RX ORDER — ESCITALOPRAM OXALATE 10 MG/1
10 TABLET ORAL DAILY
Qty: 90 TABLET | Refills: 3 | Status: SHIPPED | OUTPATIENT
Start: 2022-05-16 | End: 2022-08-14

## 2022-05-16 ASSESSMENT — PATIENT HEALTH QUESTIONNAIRE - PHQ9
SUM OF ALL RESPONSES TO PHQ QUESTIONS 1-9: 0
SUM OF ALL RESPONSES TO PHQ QUESTIONS 1-9: 0
2. FEELING DOWN, DEPRESSED OR HOPELESS: 0
SUM OF ALL RESPONSES TO PHQ9 QUESTIONS 1 & 2: 0
SUM OF ALL RESPONSES TO PHQ QUESTIONS 1-9: 0
1. LITTLE INTEREST OR PLEASURE IN DOING THINGS: 0
SUM OF ALL RESPONSES TO PHQ QUESTIONS 1-9: 0

## 2022-05-16 ASSESSMENT — ENCOUNTER SYMPTOMS
EYES NEGATIVE: 1
GASTROINTESTINAL NEGATIVE: 1
RESPIRATORY NEGATIVE: 1

## 2022-05-16 NOTE — PROGRESS NOTES
Subjective:      Patient ID: Haylee Red is a 41 y.o. male. \[facility] [place] [facility]\""  Routine follow up for annual physical and follow up on chronic medical conditions, refills, and review of updated labs. Newer patient established in the last 6 months. He had covid 19 infection. Fairly mild course with head cold and body aches. No lung issues, no fever. Lost taste and smell which are coming back slowly. History reviewed. No pertinent past medical history. Lower back sprain  covid 19 positive 1/18/2021  Anxiety    Past Surgical History:   Procedure Laterality Date    ANTERIOR CRUCIATE LIGAMENT REPAIR Right 2004   No Known Allergies    Review of Systems   Constitutional: Negative. HENT: Negative. Eyes: Negative. Respiratory: Negative. Cardiovascular: Negative. Gastrointestinal: Negative. Genitourinary: Negative. Musculoskeletal: Negative. Skin: Negative. Neurological: Negative. Psychiatric/Behavioral: The patient is nervous/anxious (confined spaces). Objective:   Physical Exam  HENT:      Head: Normocephalic. Nose: Nose normal.   Eyes:      Extraocular Movements: Extraocular movements intact. Pulmonary:      Effort: Pulmonary effort is normal. No respiratory distress. Breath sounds: No wheezing. Abdominal:      General: Bowel sounds are normal.   Musculoskeletal:      Cervical back: Normal range of motion. Skin:     Findings: No rash. Neurological:      Mental Status: He is alert. Mental status is at baseline. Psychiatric:         Mood and Affect: Mood normal.         Behavior: Behavior normal.         Thought Content: Thought content normal.         Judgment: Judgment normal.       /64 (Site: Right Upper Arm, Position: Sitting, Cuff Size: Large Adult)   Pulse 68   Ht 6' 2\" (1.88 m)   Wt 202 lb (91.6 kg)   BMI 25.94 kg/m²   No visits with results within 2 Month(s) from this visit.    Latest known visit with results is:   Orders Only on 02/03/2021 Component Date Value    Cholesterol, Total 02/02/2021 165     HDL 02/02/2021 46     LDL Calculated 02/02/2021 90     Triglycerides 02/02/2021 203     Chol/HDL Ratio 02/02/2021 3.6     Cholesterol non HDL 02/02/2021 119     Sodium 02/02/2021 142     Chloride 02/02/2021 104     Potassium 02/02/2021 4.8     BUN 02/02/2021 18     CREATININE 02/02/2021 1.11     Glucose 02/02/2021 93     CO2 02/02/2021 32     Calcium 02/02/2021 9.5     Gfr Calculated 02/02/2021 80     WBC 02/02/2021 6.0     RBC 02/02/2021 4.48     Hemoglobin 02/02/2021 14.8     Hematocrit 02/02/2021 43.8     MCV 02/02/2021 97.8     MCH 02/02/2021 33.0     MCHC 02/02/2021 33.8     Platelets 88/34/0600 285     MPV 02/02/2021 12.2        Assessment:         Encounter Diagnoses   Name Primary?  Encounter for preventive care Yes    Anxiety              Plan:      S/p covid . Mild course without need for oxygen or intervention. Residual loss of taste and smell slowly coming back. Has had covid vaccination. Labs reviewed as above . No current concerns. Anxiety: mostly work and social situations involving confined spaces and speaking. Meetings make him anxious. Bus rides, fair rides, offices all give anxiety. He would like to trial a daily medication to curb anxiety. Plan lexapro 10mg/day. Seems to be helping at present. Willing to proceed with screening colonoscopy at 41 yrs.                 Lisha Littlejohn MD

## 2022-05-17 ENCOUNTER — TELEPHONE (OUTPATIENT)
Dept: SURGERY | Age: 47
End: 2022-05-17

## 2022-06-14 ENCOUNTER — TELEPHONE (OUTPATIENT)
Dept: SURGERY | Age: 47
End: 2022-06-14

## 2022-06-14 RX ORDER — BISACODYL 5 MG
TABLET, DELAYED RELEASE (ENTERIC COATED) ORAL
Qty: 2 TABLET | Refills: 0 | Status: SHIPPED | OUTPATIENT
Start: 2022-06-14

## 2022-06-14 RX ORDER — POLYETHYLENE GLYCOL 3350, SODIUM SULFATE ANHYDROUS, SODIUM BICARBONATE, SODIUM CHLORIDE, POTASSIUM CHLORIDE 236; 22.74; 6.74; 5.86; 2.97 G/4L; G/4L; G/4L; G/4L; G/4L
POWDER, FOR SOLUTION ORAL
Qty: 4000 ML | Refills: 0 | Status: SHIPPED | OUTPATIENT
Start: 2022-06-14

## 2022-06-14 NOTE — TELEPHONE ENCOUNTER
Spoke with patient at this time scheduled for a colonoscopy Thursday 7/7/22 with Dr. Renata Metcalf at [facility]. Surgery instructions and bowel prep went over with patient at this time, denies any questions. Bowel prep sent to pharmacy of choice and all instructions mailed at this time. Acknowledges understanding of procedure and will call with any further questions.

## 2022-07-07 ENCOUNTER — HOSPITAL ENCOUNTER (OUTPATIENT)
Age: 47
Setting detail: OUTPATIENT SURGERY
Discharge: HOME | End: 2022-07-07
Attending: SURGERY | Admitting: SURGERY
Payer: COMMERCIAL

## 2022-07-07 ENCOUNTER — ANESTHESIA EVENT (OUTPATIENT)
Dept: OPERATING ROOM | Age: 47
End: 2022-07-07
Payer: COMMERCIAL

## 2022-07-07 ENCOUNTER — ANESTHESIA (OUTPATIENT)
Dept: OPERATING ROOM | Age: 47
End: 2022-07-07
Payer: COMMERCIAL

## 2022-07-07 VITALS
SYSTOLIC BLOOD PRESSURE: 102 MMHG | OXYGEN SATURATION: 99 % | DIASTOLIC BLOOD PRESSURE: 62 MMHG | BODY MASS INDEX: 25 KG/M2 | RESPIRATION RATE: 16 BRPM | TEMPERATURE: 97 F | HEART RATE: 55 BPM | WEIGHT: 194.8 LBS | HEIGHT: 74 IN

## 2022-07-07 DIAGNOSIS — Z12.11 SCREENING FOR COLON CANCER: ICD-10-CM

## 2022-07-07 PROCEDURE — 2580000003 HC RX 258: Performed by: SURGERY

## 2022-07-07 PROCEDURE — 2500000003 HC RX 250 WO HCPCS: Performed by: NURSE ANESTHETIST, CERTIFIED REGISTERED

## 2022-07-07 PROCEDURE — 7100000010 HC PHASE II RECOVERY - FIRST 15 MIN: Performed by: SURGERY

## 2022-07-07 PROCEDURE — 2709999900 HC NON-CHARGEABLE SUPPLY: Performed by: SURGERY

## 2022-07-07 PROCEDURE — 7100000011 HC PHASE II RECOVERY - ADDTL 15 MIN: Performed by: SURGERY

## 2022-07-07 PROCEDURE — 6360000002 HC RX W HCPCS: Performed by: NURSE ANESTHETIST, CERTIFIED REGISTERED

## 2022-07-07 PROCEDURE — 3700000001 HC ADD 15 MINUTES (ANESTHESIA): Performed by: SURGERY

## 2022-07-07 PROCEDURE — 88305 TISSUE EXAM BY PATHOLOGIST: CPT

## 2022-07-07 PROCEDURE — 3700000000 HC ANESTHESIA ATTENDED CARE: Performed by: SURGERY

## 2022-07-07 PROCEDURE — 3609010600 HC COLONOSCOPY POLYPECTOMY SNARE/COLD BIOPSY: Performed by: SURGERY

## 2022-07-07 PROCEDURE — 45385 COLONOSCOPY W/LESION REMOVAL: CPT | Performed by: SURGERY

## 2022-07-07 RX ORDER — SODIUM CHLORIDE 0.9 % (FLUSH) 0.9 %
5-40 SYRINGE (ML) INJECTION PRN
Status: DISCONTINUED | OUTPATIENT
Start: 2022-07-07 | End: 2022-07-07 | Stop reason: HOSPADM

## 2022-07-07 RX ORDER — SODIUM CHLORIDE, SODIUM LACTATE, POTASSIUM CHLORIDE, CALCIUM CHLORIDE 600; 310; 30; 20 MG/100ML; MG/100ML; MG/100ML; MG/100ML
INJECTION, SOLUTION INTRAVENOUS CONTINUOUS
Status: DISCONTINUED | OUTPATIENT
Start: 2022-07-07 | End: 2022-07-07 | Stop reason: HOSPADM

## 2022-07-07 RX ORDER — SODIUM CHLORIDE 9 MG/ML
INJECTION, SOLUTION INTRAVENOUS PRN
Status: DISCONTINUED | OUTPATIENT
Start: 2022-07-07 | End: 2022-07-07 | Stop reason: HOSPADM

## 2022-07-07 RX ORDER — PROPOFOL 10 MG/ML
INJECTION, EMULSION INTRAVENOUS PRN
Status: DISCONTINUED | OUTPATIENT
Start: 2022-07-07 | End: 2022-07-07 | Stop reason: SDUPTHER

## 2022-07-07 RX ORDER — SODIUM CHLORIDE 0.9 % (FLUSH) 0.9 %
5-40 SYRINGE (ML) INJECTION EVERY 12 HOURS SCHEDULED
Status: DISCONTINUED | OUTPATIENT
Start: 2022-07-07 | End: 2022-07-07 | Stop reason: HOSPADM

## 2022-07-07 RX ADMIN — SODIUM CHLORIDE, POTASSIUM CHLORIDE, SODIUM LACTATE AND CALCIUM CHLORIDE: 600; 310; 30; 20 INJECTION, SOLUTION INTRAVENOUS at 07:54

## 2022-07-07 RX ADMIN — PROPOFOL 120 MG: 10 INJECTION, EMULSION INTRAVENOUS at 08:29

## 2022-07-07 RX ADMIN — GLUCAGON HYDROCHLORIDE 1 MG: KIT at 08:43

## 2022-07-07 RX ADMIN — PROPOFOL 200 MCG/KG/MIN: 10 INJECTION, EMULSION INTRAVENOUS at 08:30

## 2022-07-07 ASSESSMENT — PAIN - FUNCTIONAL ASSESSMENT: PAIN_FUNCTIONAL_ASSESSMENT: 0-10

## 2022-07-07 ASSESSMENT — PAIN DESCRIPTION - DESCRIPTORS: DESCRIPTORS: ACHING

## 2022-07-07 ASSESSMENT — PAIN SCALES - GENERAL
PAINLEVEL_OUTOF10: 0

## 2022-07-07 NOTE — ANESTHESIA POSTPROCEDURE EVALUATION
Department of Anesthesiology  Postprocedure Note    Patient: Alice Camejo  MRN: [id number]  YOB: 1975  Date of evaluation: 7/7/2022      Procedure Summary     Date: 07/07/22 Room / Location: [address]    Anesthesia Start: 6415 Anesthesia Stop:     Procedure: COLONOSCOPY POLYPECTOMY SNARE (N/A Anus) Diagnosis:       Screening for colon cancer      (Screening for colon cancer [Z12.11])    Surgeons: Carlo Wilson DO Responsible Provider: RACHANA Lara CRNA    Anesthesia Type: general ASA Status: 2          Anesthesia Type: No value filed.     Marco A Phase I: Marco A Score: 9    Marco A Phase II:        Anesthesia Post Evaluation    Patient location during evaluation: bedside  Patient participation: complete - patient participated  Level of consciousness: awake and alert  Pain score: 0  Airway patency: patent  Nausea & Vomiting: no vomiting  Complications: no  Cardiovascular status: hemodynamically stable  Respiratory status: acceptable  Hydration status: euvolemic

## 2022-07-07 NOTE — H&P
Subjective   Yeimy Griffith is a 41 y.o. male who presents today for initial screening colonoscopy. Pt denies any changes in bowel movements, blood per rectum, melena or unintended weight loss. No reported family hx of colon cancer. No past medical history on file. Past Surgical History:   Procedure Laterality Date    ANTERIOR CRUCIATE LIGAMENT REPAIR Right 2004       No current facility-administered medications for this encounter. Current Outpatient Medications   Medication Sig Dispense Refill    bisacodyl (BISACODYL) 5 MG EC tablet Take two tablets at noon on 7/6/22 for colonoscopy bowel prep. 2 tablet 0    PEG 3350-KCl-NaBcb-NaCl-NaSulf (PEG-3350/ELECTROLYTES) 236 g SOLR Mix as directed. Beginning at 4:00pm on 7/6/22 drink an eight ounce glass every ten minutes until gone. 4000 mL 0    escitalopram (LEXAPRO) 10 MG tablet Take 1 tablet by mouth daily 90 tablet 3       No Known Allergies    No family history on file. Social History     Socioeconomic History    Marital status:      Spouse name: Not on file    Number of children: Not on file    Years of education: Not on file    Highest education level: Not on file   Occupational History    Occupation: Teacher     Employer: [employer]     Comment: 6th grade   Tobacco Use    Smoking status: Never Smoker    Smokeless tobacco: Never Used   Substance and Sexual Activity    Alcohol use: Yes     Comment: rarely    Drug use: No    Sexual activity: Yes   Other Topics Concern    Not on file   Social History Narrative    Not on file     Social Determinants of Health     Financial Resource Strain:     Difficulty of Paying Living Expenses: Not on file   Food Insecurity:     Worried About Running Out of Food in the Last Year: Not on file    Yun of Food in the Last Year: Not on file   Transportation Needs:     Lack of Transportation (Medical): Not on file    Lack of Transportation (Non-Medical):  Not on file   Physical Activity:     Days of Exercise per Week: Not on file    Minutes of Exercise per Session: Not on file   Stress:     Feeling of Stress : Not on file   Social Connections:     Frequency of Communication with Friends and Family: Not on file    Frequency of Social Gatherings with Friends and Family: Not on file    Attends Roman [employer] Services: Not on file    Active Member of [address] [place] [address] or Organizations: Not on file    Attends Club or Organization Meetings: Not on file    Marital Status: Not on file   Intimate Partner Violence:     Fear of Current or Ex-Partner: Not on file    Emotionally Abused: Not on file    Physically Abused: Not on file    Sexually Abused: Not on file   Housing Stability:     Unable to Pay for Housing in the Last Year: Not on file    Number of [place] in the Last Year: Not on file    Unstable Housing in the Last Year: Not on file       ROS:   Review of Systems - Negative except as noted in HPI      Objective   There were no vitals filed for this visit. General:in no apparent distress  Eyes: No gross abnormalities. Ears, Nose, Throat: hearing grossly normal bilaterally  Neck: neck supple and non tender without mass  Lungs: clear to auscultation without wheezes or rales   Heart: S1S2, no mumurs, RRR  Abdomen: soft, nontender, no HSM, no guarding, no rebound, no masses  Extremity: negative  Neuro: CN II-XII grossly intact      Assessment     1. Screening colonoscopy      Plan     1.  Proceed as planned    Electronically signed by Janie Gutierrez DO on 7/6/2022 at 9:36 PM      (Please note that portions of this note were completed with a voice recognition program.  Efforts were made to edit the dictations but occasionally words are mis-transcribed.)

## 2022-07-07 NOTE — OP NOTE
[address]                 [address] [place] [address]                                OPERATIVE REPORT    PATIENT NAME: Sivakumar Horn                   :        1975  MED REC NO:   [id number]                             ROOM:  ACCOUNT NO:   [de-identified]                           ADMIT DATE: 2022  PROVIDER:     Gatito Gonzalez    DATE OF PROCEDURE:  2022    SURGEON:  Dr. Gatito Gonzalez. ASSISTANT:  None. PREOPERATIVE DIAGNOSIS:  Screening. POSTOPERATIVE DIAGNOSES:  1.  Screening. 2.  Colon polyp. PROCEDURE:  Colonoscopy with hot snare polypectomy. ANESTHESIA:  MAC.    ESTIMATED BLOOD LOSS:  Minimal.    FLUIDS:  Per anesthesia record. COMPLICATIONS:  None. SPECIMENS:  Polyp in ascending colon removed with hot snare. INDICATIONS FOR PROCEDURE:  The patient is a 63-year-old gentleman who  is referred to my office for screening colonoscopy. After evaluation,  decision was made to proceed. Prior to the time of the procedure,  risks, benefits, and alternatives were explained to the patient and  consent was obtained. DESCRIPTION OF PROCEDURE:  The patient was brought to endoscopy suite,  kept on preoperative gurney and placed in the left lateral decubitus  position. Monitoring devices were placed. MAC anesthesia was induced. After induction of anesthesia, time-out was performed and correct  patient and procedure were verified. Digital rectal exam was performed  which showed no abnormalities. The Olympus video endoscope was  lubricated, inserted into the patient's rectum which was gently  insufflated with air. Scope was then slowly advanced through the colon  under visualization to the level of the cecum which was identified by  appendiceal orifice and ileocecal valve. During advancement of the  scope, bowel prep was noted to be adequate.   Scope was then slowly  withdrawn through the colon with withdrawal time being greater than 7  minutes. During this time, colonic mucosa was carefully inspected. The  patient was noted to have a fairly large polyp in the right colon and  this was removed with hot snare. Unfortunately, because of the size, it  could not be suctioned through the scope and _____ was suctioned to the  end of the scope and then brought out and passed off as our specimen. The scope was then advanced back to the level of the polypectomy and  inspection showed good hemostasis. I continued to withdraw the scope  through the remainder of the colon with no other findings. Once in the  rectum, a retroflex view was obtained which showed no distal rectal  abnormalities. Scope was then straightened and removed and procedure  was concluded. At conclusion of the procedure, the patient was in  stable condition and was taken to the PACU for recovery. PLAN:  I will follow up results of pathology and make final  recommendations at that time. The patient will likely need a repeat  colonoscopy in approximately 5 years.         Ryan Jean    D: 07/07/2022 8:58:51       T: 07/07/2022 9:01:12     [email or web address]  Job#: [id number]     Doc#: [id number]    CC:  Primary Care

## 2022-07-08 LAB — SURGICAL PATHOLOGY REPORT: NORMAL

## 2023-05-15 RX ORDER — ESCITALOPRAM OXALATE 10 MG/1
TABLET ORAL
Qty: 90 TABLET | Refills: 3 | OUTPATIENT
Start: 2023-05-15

## 2023-06-06 ENCOUNTER — OFFICE VISIT (OUTPATIENT)
Dept: FAMILY MEDICINE CLINIC | Age: 48
End: 2023-06-06
Payer: COMMERCIAL

## 2023-06-06 VITALS
HEIGHT: 75 IN | DIASTOLIC BLOOD PRESSURE: 68 MMHG | HEART RATE: 81 BPM | OXYGEN SATURATION: 96 % | WEIGHT: 206.8 LBS | SYSTOLIC BLOOD PRESSURE: 120 MMHG | BODY MASS INDEX: 25.71 KG/M2

## 2023-06-06 DIAGNOSIS — Z00.00 ENCOUNTER FOR PREVENTIVE CARE: Primary | ICD-10-CM

## 2023-06-06 DIAGNOSIS — M54.50 CHRONIC BILATERAL LOW BACK PAIN WITHOUT SCIATICA: ICD-10-CM

## 2023-06-06 DIAGNOSIS — G89.29 CHRONIC BILATERAL LOW BACK PAIN WITHOUT SCIATICA: ICD-10-CM

## 2023-06-06 DIAGNOSIS — F41.9 ANXIETY: ICD-10-CM

## 2023-06-06 PROCEDURE — 99396 PREV VISIT EST AGE 40-64: CPT | Performed by: FAMILY MEDICINE

## 2023-06-06 RX ORDER — ESCITALOPRAM OXALATE 10 MG/1
10 TABLET ORAL DAILY
Qty: 90 TABLET | Refills: 3 | Status: SHIPPED | OUTPATIENT
Start: 2023-06-06 | End: 2024-05-31

## 2023-06-06 SDOH — ECONOMIC STABILITY: INCOME INSECURITY: HOW HARD IS IT FOR YOU TO PAY FOR THE VERY BASICS LIKE FOOD, HOUSING, MEDICAL CARE, AND HEATING?: NOT HARD AT ALL

## 2023-06-06 SDOH — ECONOMIC STABILITY: HOUSING INSECURITY
IN THE LAST 12 MONTHS, WAS THERE A TIME WHEN YOU DID NOT HAVE A STEADY PLACE TO SLEEP OR SLEPT IN A SHELTER (INCLUDING NOW)?: NO

## 2023-06-06 SDOH — ECONOMIC STABILITY: FOOD INSECURITY: WITHIN THE PAST 12 MONTHS, YOU WORRIED THAT YOUR FOOD WOULD RUN OUT BEFORE YOU GOT MONEY TO BUY MORE.: NEVER TRUE

## 2023-06-06 SDOH — ECONOMIC STABILITY: FOOD INSECURITY: WITHIN THE PAST 12 MONTHS, THE FOOD YOU BOUGHT JUST DIDN'T LAST AND YOU DIDN'T HAVE MONEY TO GET MORE.: NEVER TRUE

## 2023-06-06 ASSESSMENT — PATIENT HEALTH QUESTIONNAIRE - PHQ9
SUM OF ALL RESPONSES TO PHQ QUESTIONS 1-9: 0
1. LITTLE INTEREST OR PLEASURE IN DOING THINGS: 0
SUM OF ALL RESPONSES TO PHQ9 QUESTIONS 1 & 2: 0
2. FEELING DOWN, DEPRESSED OR HOPELESS: 0
SUM OF ALL RESPONSES TO PHQ QUESTIONS 1-9: 0

## 2023-06-06 ASSESSMENT — ENCOUNTER SYMPTOMS
EYES NEGATIVE: 1
GASTROINTESTINAL NEGATIVE: 1
RESPIRATORY NEGATIVE: 1

## 2023-06-06 NOTE — PROGRESS NOTES
Pt declines updated vaccines.
curb anxiety. Plan lexapro 10mg/day. Seems to be helping at present. Feels like he is stable and satisfied with current dosing still . Plan to cont. Same. 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  5 yr follow up. Chronic lower back pain. Exercising and stretching regularly and feels it is some better. Has not had severe/debilitating pain in a couple of years.               Pablo Soares MD

## 2024-05-13 DIAGNOSIS — F41.9 ANXIETY: Primary | ICD-10-CM

## 2024-05-13 RX ORDER — ESCITALOPRAM OXALATE 10 MG/1
10 TABLET ORAL DAILY
Qty: 90 TABLET | Refills: 3 | Status: SHIPPED | OUTPATIENT
Start: 2024-05-13

## 2024-05-13 NOTE — TELEPHONE ENCOUNTER
Johnathon called requesting a refill of the below medication which has been pended for you:     Requested Prescriptions     Pending Prescriptions Disp Refills    escitalopram (LEXAPRO) 10 MG tablet [Pharmacy Med Name: ESCITALOPRAM TABS 10MG] 90 tablet 3     Sig: TAKE 1 TABLET DAILY       Last Appointment Date: 6/6/2023  Next Appointment Date: 6/11/2024    No Known Allergies

## 2024-06-09 SDOH — ECONOMIC STABILITY: FOOD INSECURITY: WITHIN THE PAST 12 MONTHS, YOU WORRIED THAT YOUR FOOD WOULD RUN OUT BEFORE YOU GOT MONEY TO BUY MORE.: NEVER TRUE

## 2024-06-09 SDOH — ECONOMIC STABILITY: FOOD INSECURITY: WITHIN THE PAST 12 MONTHS, THE FOOD YOU BOUGHT JUST DIDN'T LAST AND YOU DIDN'T HAVE MONEY TO GET MORE.: NEVER TRUE

## 2024-06-09 SDOH — ECONOMIC STABILITY: INCOME INSECURITY: HOW HARD IS IT FOR YOU TO PAY FOR THE VERY BASICS LIKE FOOD, HOUSING, MEDICAL CARE, AND HEATING?: NOT HARD AT ALL

## 2024-06-09 SDOH — ECONOMIC STABILITY: TRANSPORTATION INSECURITY
IN THE PAST 12 MONTHS, HAS LACK OF TRANSPORTATION KEPT YOU FROM MEETINGS, WORK, OR FROM GETTING THINGS NEEDED FOR DAILY LIVING?: NO

## 2024-06-09 ASSESSMENT — PATIENT HEALTH QUESTIONNAIRE - PHQ9
SUM OF ALL RESPONSES TO PHQ9 QUESTIONS 1 & 2: 0
SUM OF ALL RESPONSES TO PHQ QUESTIONS 1-9: 0
2. FEELING DOWN, DEPRESSED OR HOPELESS: NOT AT ALL
SUM OF ALL RESPONSES TO PHQ9 QUESTIONS 1 & 2: 0
SUM OF ALL RESPONSES TO PHQ QUESTIONS 1-9: 0
SUM OF ALL RESPONSES TO PHQ QUESTIONS 1-9: 0
1. LITTLE INTEREST OR PLEASURE IN DOING THINGS: NOT AT ALL
1. LITTLE INTEREST OR PLEASURE IN DOING THINGS: NOT AT ALL
2. FEELING DOWN, DEPRESSED OR HOPELESS: NOT AT ALL
SUM OF ALL RESPONSES TO PHQ QUESTIONS 1-9: 0

## 2024-06-11 ENCOUNTER — OFFICE VISIT (OUTPATIENT)
Dept: FAMILY MEDICINE CLINIC | Age: 49
End: 2024-06-11
Payer: COMMERCIAL

## 2024-06-11 VITALS
WEIGHT: 213.2 LBS | OXYGEN SATURATION: 99 % | DIASTOLIC BLOOD PRESSURE: 82 MMHG | HEART RATE: 82 BPM | HEIGHT: 74 IN | BODY MASS INDEX: 27.36 KG/M2 | SYSTOLIC BLOOD PRESSURE: 115 MMHG

## 2024-06-11 DIAGNOSIS — Z86.010 HISTORY OF ADENOMATOUS POLYP OF COLON: ICD-10-CM

## 2024-06-11 DIAGNOSIS — G89.29 CHRONIC BILATERAL LOW BACK PAIN WITHOUT SCIATICA: ICD-10-CM

## 2024-06-11 DIAGNOSIS — Z00.00 ENCOUNTER FOR PREVENTIVE CARE: Primary | ICD-10-CM

## 2024-06-11 DIAGNOSIS — M54.50 CHRONIC BILATERAL LOW BACK PAIN WITHOUT SCIATICA: ICD-10-CM

## 2024-06-11 DIAGNOSIS — F41.9 ANXIETY: ICD-10-CM

## 2024-06-11 PROCEDURE — 99396 PREV VISIT EST AGE 40-64: CPT | Performed by: FAMILY MEDICINE

## 2024-06-11 RX ORDER — ESCITALOPRAM OXALATE 10 MG/1
10 TABLET ORAL DAILY
Qty: 90 TABLET | Refills: 3 | Status: SHIPPED | OUTPATIENT
Start: 2024-06-11

## 2024-06-11 ASSESSMENT — ENCOUNTER SYMPTOMS
GASTROINTESTINAL NEGATIVE: 1
EYES NEGATIVE: 1
RESPIRATORY NEGATIVE: 1

## 2024-06-11 NOTE — PROGRESS NOTES
Judgment: Judgment normal.       /82 (Site: Left Upper Arm, Position: Sitting, Cuff Size: Medium Adult)   Pulse 82   Ht 1.88 m (6' 2\")   Wt 96.7 kg (213 lb 3.2 oz)   SpO2 99%   BMI 27.37 kg/m²   No visits with results within 2 Month(s) from this visit.   Latest known visit with results is:   Admission on 07/07/2022, Discharged on 07/07/2022   Component Date Value    Surgical Pathology Report 07/07/2022                      Value:-- Diagnosis --  ASCENDING COLON, POLYPECTOMY:  -TUBULOVILLOUS ADENOMA.      Nick Brice M.D.  **Electronically Signed Out**         des/7/8/2022       Clinical Information  Pre-Operative Diagnosis: SCREENING FOR COLON CANCER  Operative Findings: ASCENDING COLON POLYP  Operation Performed: COLONOSCOPY POLYPECTOMY SNARE    Source of Specimen  A: ASCENDING COLON POLYP    Gross Description  \"PAUL MO ASCENDING COLON POLYP\" is a pink-tan polypoid portion  of tissue that measures 1.5 x 0.7 x 0.7 cm.  The surgical margin is  inked.  The specimen is bisected and entirely submitted 1cs.  leonie  js       Microscopic Description  Microscopic examination performed.    SURGICAL PATHOLOGY CONSULTATION       Patient Name: PAUL MO  [facility] Rec: [id number]  Path Number: [id number]    [facility]  [employer]  CONSULTING PATHOLOGISTS [facility]  ANATOMIC PATHOLOGY  [address] [place] [address].  [place], [place] [address] [phone]  Fax: [phone]         Assessment:       Encounter Diagnoses   Name Primary?    Encounter for preventive care Yes    Anxiety     Chronic bilateral low back pain without sciatica     History of adenomatous polyp of colon              Plan:   Assessment & Plan      Labs available for draw/processing.      Anxiety: mostly work and social situations involving confined spaces and speaking.  Meetings make him anxious.  Bus rides, fair rides, offices all give anxiety.  He would like to trial a daily medication to curb anxiety.  Plan lexapro 10mg/day.  Seems to

## 2024-06-19 LAB
ALBUMIN SERPL-MCNC: 4.4 G/DL
ALP BLD-CCNC: 53 U/L
ALT SERPL-CCNC: 23 U/L
ANION GAP SERPL CALCULATED.3IONS-SCNC: NORMAL MMOL/L
AST SERPL-CCNC: 22 U/L
BASOPHILS ABSOLUTE: 0.1 /ΜL
BASOPHILS RELATIVE PERCENT: 1 %
BILIRUB SERPL-MCNC: 0.7 MG/DL (ref 0.1–1.4)
BUN BLDV-MCNC: 14 MG/DL
CALCIUM SERPL-MCNC: 9.4 MG/DL
CHLORIDE BLD-SCNC: 104 MMOL/L
CHOLESTEROL, TOTAL: 245 MG/DL
CHOLESTEROL/HDL RATIO: 5.6
CO2: 24 MMOL/L
CREAT SERPL-MCNC: 1.15 MG/DL
EGFR: NORMAL
EOSINOPHILS ABSOLUTE: 0.2 /ΜL
EOSINOPHILS RELATIVE PERCENT: 4 %
GLUCOSE BLD-MCNC: 98 MG/DL
HCT VFR BLD CALC: 44.9 % (ref 41–53)
HDLC SERPL-MCNC: 44 MG/DL (ref 35–70)
HEMOGLOBIN: 15.1 G/DL (ref 13.5–17.5)
LDL CHOLESTEROL CALCULATED: 150 MG/DL (ref 0–160)
LYMPHOCYTES ABSOLUTE: 1.3 /ΜL
LYMPHOCYTES RELATIVE PERCENT: 25 %
MCH RBC QN AUTO: 33.4 PG
MCHC RBC AUTO-ENTMCNC: 33.6 G/DL
MCV RBC AUTO: 99 FL
MONOCYTES ABSOLUTE: 0.5 /ΜL
MONOCYTES RELATIVE PERCENT: 9 %
NEUTROPHILS ABSOLUTE: 3.2 /ΜL
NEUTROPHILS RELATIVE PERCENT: 61 %
NONHDLC SERPL-MCNC: ABNORMAL MG/DL
PDW BLD-RTO: 12.7 %
PLATELET # BLD: 178 K/ΜL
PMV BLD AUTO: ABNORMAL FL
POTASSIUM SERPL-SCNC: 4.4 MMOL/L
RBC # BLD: 4.52 10^6/ΜL
SODIUM BLD-SCNC: 141 MMOL/L
TOTAL PROTEIN: 7
TRIGL SERPL-MCNC: 276 MG/DL
VLDLC SERPL CALC-MCNC: 51 MG/DL
WBC # BLD: 5.2 10^3/ML

## 2024-06-20 DIAGNOSIS — Z00.00 ENCOUNTER FOR PREVENTIVE CARE: ICD-10-CM

## 2025-05-26 ENCOUNTER — PATIENT MESSAGE (OUTPATIENT)
Dept: FAMILY MEDICINE CLINIC | Age: 50
End: 2025-05-26

## 2025-06-30 DIAGNOSIS — F41.9 ANXIETY: ICD-10-CM

## 2025-06-30 NOTE — TELEPHONE ENCOUNTER
Last Appt:  6/11/2024  Next Appt:   Visit date not found  Med verified in Epic needs called fr appt   AROM

## 2025-07-02 RX ORDER — ESCITALOPRAM OXALATE 10 MG/1
10 TABLET ORAL DAILY
Qty: 90 TABLET | Refills: 3 | OUTPATIENT
Start: 2025-07-02

## 2025-07-16 ENCOUNTER — OFFICE VISIT (OUTPATIENT)
Dept: FAMILY MEDICINE CLINIC | Age: 50
End: 2025-07-16
Payer: COMMERCIAL

## 2025-07-16 VITALS
WEIGHT: 203.6 LBS | HEART RATE: 76 BPM | BODY MASS INDEX: 26.13 KG/M2 | DIASTOLIC BLOOD PRESSURE: 86 MMHG | SYSTOLIC BLOOD PRESSURE: 122 MMHG | OXYGEN SATURATION: 98 % | HEIGHT: 74 IN

## 2025-07-16 DIAGNOSIS — M54.50 CHRONIC BILATERAL LOW BACK PAIN WITHOUT SCIATICA: ICD-10-CM

## 2025-07-16 DIAGNOSIS — Z86.0101 HISTORY OF ADENOMATOUS POLYP OF COLON: ICD-10-CM

## 2025-07-16 DIAGNOSIS — F41.9 ANXIETY: ICD-10-CM

## 2025-07-16 DIAGNOSIS — G89.29 CHRONIC BILATERAL LOW BACK PAIN WITHOUT SCIATICA: ICD-10-CM

## 2025-07-16 DIAGNOSIS — Z00.00 ENCOUNTER FOR PREVENTIVE CARE: Primary | ICD-10-CM

## 2025-07-16 PROCEDURE — 99396 PREV VISIT EST AGE 40-64: CPT | Performed by: FAMILY MEDICINE

## 2025-07-16 RX ORDER — ESCITALOPRAM OXALATE 10 MG/1
10 TABLET ORAL DAILY
Qty: 90 TABLET | Refills: 3 | Status: SHIPPED | OUTPATIENT
Start: 2025-07-16

## 2025-07-16 SDOH — ECONOMIC STABILITY: FOOD INSECURITY: WITHIN THE PAST 12 MONTHS, YOU WORRIED THAT YOUR FOOD WOULD RUN OUT BEFORE YOU GOT MONEY TO BUY MORE.: NEVER TRUE

## 2025-07-16 SDOH — ECONOMIC STABILITY: FOOD INSECURITY: WITHIN THE PAST 12 MONTHS, THE FOOD YOU BOUGHT JUST DIDN'T LAST AND YOU DIDN'T HAVE MONEY TO GET MORE.: NEVER TRUE

## 2025-07-16 ASSESSMENT — PATIENT HEALTH QUESTIONNAIRE - PHQ9
SUM OF ALL RESPONSES TO PHQ QUESTIONS 1-9: 0
SUM OF ALL RESPONSES TO PHQ QUESTIONS 1-9: 0
1. LITTLE INTEREST OR PLEASURE IN DOING THINGS: NOT AT ALL
SUM OF ALL RESPONSES TO PHQ QUESTIONS 1-9: 0
SUM OF ALL RESPONSES TO PHQ QUESTIONS 1-9: 0
2. FEELING DOWN, DEPRESSED OR HOPELESS: NOT AT ALL

## 2025-07-16 ASSESSMENT — ENCOUNTER SYMPTOMS
GASTROINTESTINAL NEGATIVE: 1
EYES NEGATIVE: 1
RESPIRATORY NEGATIVE: 1

## 2025-07-16 NOTE — PROGRESS NOTES
Subjective:      Patient ID: Johnathon Frye is a 50 y.o. male.    [facility]  Routine follow up for annual physical and follow up on chronic medical conditions, refills, and review of updated labs.   Doing well over the interval.  No significant injury other then some elbow pain from throwing baseballs.  Mood good.  Compliant with medication.  No significant illness or injury.            History reviewed. No pertinent past medical history.   Lower back sprain  covid 19 positive 1/18/2021  Anxiety    Past Surgical History:   Procedure Laterality Date    ANTERIOR CRUCIATE LIGAMENT REPAIR Right 2004    COLONOSCOPY N/A 7/7/2022    COLONOSCOPY POLYPECTOMY SNARE performed by Juan Guerrero DO at [facility] OR   No Known Allergies      Current Outpatient Medications   Medication Sig Dispense Refill    escitalopram (LEXAPRO) 10 MG tablet Take 1 tablet by mouth daily 90 tablet 3     No current facility-administered medications for this visit.     No Known Allergies    Review of Systems   Constitutional: Negative.    HENT: Negative.     Eyes: Negative.    Respiratory: Negative.     Cardiovascular: Negative.    Gastrointestinal: Negative.    Genitourinary: Negative.    Musculoskeletal: Negative.    Skin: Negative.    Neurological: Negative.    Psychiatric/Behavioral:  The patient is nervous/anxious (confined spaces).        Objective:   Physical Exam  HENT:      Head: Normocephalic.      Right Ear: There is impacted cerumen.      Nose: Nose normal.   Eyes:      Extraocular Movements: Extraocular movements intact.   Pulmonary:      Effort: Pulmonary effort is normal. No respiratory distress.      Breath sounds: No wheezing.   Abdominal:      General: Bowel sounds are normal.   Musculoskeletal:      Cervical back: Normal range of motion.   Skin:     Findings: No rash.   Neurological:      Mental Status: He is alert. Mental status is at baseline.   Psychiatric:         Mood and Affect: Mood normal.         Behavior: Behavior normal.

## (undated) DEVICE — LINE SAMP O2 6.5FT W/FEMALE CONN F/ADULT CAPNOLINE PLUS

## (undated) DEVICE — CONNECTOR TBNG AUX H2O JET DISP FOR OLY 160/180 SER

## (undated) DEVICE — 9165 UNIVERSAL PATIENT PLATE: Brand: 3M™

## (undated) DEVICE — MERCY DEFIANCE ENDO KIT: Brand: MEDLINE INDUSTRIES, INC.

## (undated) DEVICE — TRAP SURG QUAD PARABOLA SLOT DSGN SFTY SCRN TRAPEASE

## (undated) DEVICE — 60 ML SYRINGE REGULAR TIP: Brand: MONOJECT

## (undated) DEVICE — SNARE ENDOSCP M W27MMXL240CM SHTH DIA2.4MM OVL FLX DISP

## (undated) DEVICE — 1200CC GUARDIAN II: Brand: GUARDIAN

## (undated) DEVICE — COLONOSCOPE ENDOSCP ABSORBENT SM PEDIATRIC 104 MM VISION